# Patient Record
Sex: MALE | Race: BLACK OR AFRICAN AMERICAN | NOT HISPANIC OR LATINO | URBAN - METROPOLITAN AREA
[De-identification: names, ages, dates, MRNs, and addresses within clinical notes are randomized per-mention and may not be internally consistent; named-entity substitution may affect disease eponyms.]

---

## 2022-02-14 ENCOUNTER — EMERGENCY (EMERGENCY)
Facility: HOSPITAL | Age: 42
LOS: 1 days | Discharge: HOME | End: 2022-02-14
Attending: EMERGENCY MEDICINE | Admitting: EMERGENCY MEDICINE
Payer: COMMERCIAL

## 2022-02-14 VITALS
RESPIRATION RATE: 16 BRPM | DIASTOLIC BLOOD PRESSURE: 69 MMHG | OXYGEN SATURATION: 100 % | TEMPERATURE: 98 F | HEART RATE: 60 BPM | SYSTOLIC BLOOD PRESSURE: 127 MMHG

## 2022-02-14 PROCEDURE — 99284 EMERGENCY DEPT VISIT MOD MDM: CPT

## 2022-02-14 NOTE — ED ADULT TRIAGE NOTE - CHIEF COMPLAINT QUOTE
Pt c/o L sided headache x 1 week. States pain has been constant, taking Tylenol at home w/ minimal relief. Denies blurry vision, weakness, nausea/vomiting, dizziness. Currently taking Rizatriptan for migraines. PMHx Migraines

## 2022-02-15 RX ORDER — METOCLOPRAMIDE HCL 10 MG
10 TABLET ORAL ONCE
Refills: 0 | Status: COMPLETED | OUTPATIENT
Start: 2022-02-15 | End: 2022-02-15

## 2022-02-15 RX ORDER — CYCLOBENZAPRINE HYDROCHLORIDE 10 MG/1
5 TABLET, FILM COATED ORAL ONCE
Refills: 0 | Status: COMPLETED | OUTPATIENT
Start: 2022-02-15 | End: 2022-02-15

## 2022-02-15 RX ORDER — KETOROLAC TROMETHAMINE 30 MG/ML
15 SYRINGE (ML) INJECTION ONCE
Refills: 0 | Status: DISCONTINUED | OUTPATIENT
Start: 2022-02-15 | End: 2022-02-15

## 2022-02-15 RX ORDER — SODIUM CHLORIDE 9 MG/ML
1000 INJECTION INTRAMUSCULAR; INTRAVENOUS; SUBCUTANEOUS ONCE
Refills: 0 | Status: COMPLETED | OUTPATIENT
Start: 2022-02-15 | End: 2022-02-15

## 2022-02-15 RX ORDER — CYCLOBENZAPRINE HYDROCHLORIDE 10 MG/1
1 TABLET, FILM COATED ORAL
Qty: 6 | Refills: 0
Start: 2022-02-15 | End: 2022-02-16

## 2022-02-15 RX ADMIN — CYCLOBENZAPRINE HYDROCHLORIDE 5 MILLIGRAM(S): 10 TABLET, FILM COATED ORAL at 00:52

## 2022-02-15 RX ADMIN — Medication 15 MILLIGRAM(S): at 00:49

## 2022-02-15 RX ADMIN — SODIUM CHLORIDE 1000 MILLILITER(S): 9 INJECTION INTRAMUSCULAR; INTRAVENOUS; SUBCUTANEOUS at 00:49

## 2022-02-15 RX ADMIN — Medication 10 MILLIGRAM(S): at 00:49

## 2022-02-15 NOTE — ED PROVIDER NOTE - ATTENDING CONTRIBUTION TO CARE
42yo M pmhx htn nd migraines on prn rizatriptan, p/w 1 wk of atraumatic L temporal headache with associated L neck and jaw pain. Pain worse with rotation of neck. Pt has seen neurologist in past and had MRI for his headaches and told it was normal.  No fevers or focal weakness or paresthesias    General: Patient alert in no apparent distress  Skin: Dry and intact  HEENT: Head atraumatic. Oral mucosa moist. no ttp to temporal artery region  Eyes: Conjunctiva normal  Cardiac: Regular rhythm and rate. No pretibial edema b/l  Respiratory: Lungs clear b/l and symmetric. No respiratory distress. Able to speak in complete sentences.  Gastrointestinal: Abdomen soft, nondistended, nontender  Musculoskeletal: Moves all extremities spontaneously. +ttp to L cervical paravertebral muscles.  Neurological: alert and oriented to person, place, and time  Psychiatric: Calm and cooperative    a/p  headache and neck pain  not meningitic  tension ha/migrain/muscle strain  pain meds then re-eval  likely dc home with supportive care

## 2022-02-15 NOTE — ED PROVIDER NOTE - CLINICAL SUMMARY MEDICAL DECISION MAKING FREE TEXT BOX
Concern for muscle spasm, migraine. No red flag headache sxs. No neuro deficits. Labs, IVF. Plan to dc home with continue follow up with neurologist.

## 2022-02-15 NOTE — ED PROVIDER NOTE - PATIENT PORTAL LINK FT
You can access the FollowMyHealth Patient Portal offered by [facility] by registering at the following website: [email or web address]. By joining [facility]’s FollowMyHealth portal, you will also be able to view your health information using other applications (apps) compatible with our system.

## 2022-02-15 NOTE — ED ADULT NURSE NOTE - OBJECTIVE STATEMENT
42yo male received in intake 10b. pt A&Ox4, ambulatory, hx of HTN, c/o headache and neck pain for a week. pain worsened with movement. outpt MRI negative. respiration even and non-labored. in nad. 20G IV placed in lac, lab drawn and sent. md marquez in progress. safety maintained.

## 2022-02-15 NOTE — ED PROVIDER NOTE - NSFOLLOWUPINSTRUCTIONS_ED_ALL_ED_FT
You were seen for headache.     See attached instructions for more information.    No signs of emergency medical condition on today's workup.  Your results are attached with your discharge instructions, please review them with your primary care physician. If there is a result pending, you will receive a call if test is positive.    A presumptive diagnosis is made today, but further evaluation may be required by your primary care doctor and/or specialist for a definitive diagnosis. Therefore, follow up as directed and if symptoms change/worsen or any emergency conditions, please return to the ER.    For pain or fever you can ibuprofen (motrin, advil) or tylenol as needed, as directed on packaging.    You can use 500-1000mg Tylenol every 6 hours for pain - as needed.  This is an over-the-counter medications - please respect the warnings on the label. This medication come with certain risks and side effects that you need to discuss with your doctor, especially if you are taking it for a prolonged period.    You can use 400-600mg Ibuprofen (such as motrin or advil) every 6 to 8 hours as needed for pain control.  Take ibuprofen with food or milk to lessen stomach upset.  This is an over-the-counter medication please respect the warnings on the label. All medications come with certain risks and side effects that you need to discuss with your doctor, especially if you are taking them for a prolonged period.      If needed, call patient access services at [phone] to find a primary care doctor, or call at [phone] to make an appointment at the clinic.      Acute Headache    WHAT YOU NEED TO KNOW:    An acute headache is pain or discomfort that may start suddenly and get worse quickly. You may have an acute headache only when you feel stress or eat certain foods. Other acute headache pain can happen every day, and sometimes several times a day.     DISCHARGE INSTRUCTIONS:    Seek care immediately if:   •You have severe pain.      •You have numbness or weakness on one side of your face or body.      •You have a headache that occurs after a blow to the head, a fall, or other trauma.       •You have a headache, are forgetful or confused, or have trouble speaking.      •You have a headache, stiff neck, and a fever.      Call your doctor if:   •You have a constant headache and are vomiting.      •You have a headache each day that does not get better, even after treatment.      •You have changes in your headaches, or new symptoms that occur when you have a headache.      •You have questions or concerns about your condition or care.      Medicines: You may need any of the following:   •Prescription pain medicine may be given. The medicine your healthcare provider recommends will depend on the kind of headaches you have. You will need to take prescription headache medicines as directed to prevent a problem called rebound headache. These headaches happen with regular use of pain relievers for headache disorders.      •NSAIDs, such as ibuprofen, help decrease swelling, pain, and fever. This medicine is available with or without a doctor's order. NSAIDs can cause stomach bleeding or kidney problems in certain people. If you take blood thinner medicine, always ask your healthcare provider if NSAIDs are safe for you. Always read the medicine label and follow directions.      •Acetaminophen decreases pain and fever. It is available without a doctor's order. Ask how much to take and how often to take it. Follow directions. Read the labels of all other medicines you are using to see if they also contain acetaminophen, or ask your doctor or pharmacist. Acetaminophen can cause liver damage if not taken correctly. Do not use more than 3 grams (3,000 milligrams) total of acetaminophen in one day.       •Antidepressants may be given for some kinds of headaches.       •Take your medicine as directed. Contact your healthcare provider if you think your medicine is not helping or if you have side effects. Tell him or her if you are allergic to any medicine. Keep a list of the medicines, vitamins, and herbs you take. Include the amounts, and when and why you take them. Bring the list or the pill bottles to follow-up visits. Carry your medicine list with you in case of an emergency.      Manage your symptoms:   •Apply heat or ice on the headache area. Use a heat or ice pack. For an ice pack, you can also put crushed ice in a plastic bag. Cover the pack or bag with a towel before you apply it to your skin. Ice and heat both help decrease pain, and heat also helps decrease muscle spasms. Apply heat for 20 to 30 minutes every 2 hours. Apply ice for 15 to 20 minutes every hour. Apply heat or ice for as long and for as many days as directed. You may alternate heat and ice.      •Relax your muscles. Lie down in a comfortable position and close your eyes. Relax your muscles slowly. Start at your toes and work your way up your body.      •Keep a record of your headaches. Write down when your headaches start and stop. Include your symptoms and what you were doing when the headache began. Record what you ate or drank for 24 hours before the headache started. Describe the pain and where it hurts. Keep track of what you did to treat your headache and if it worked.     Prevent an acute headache:   •Avoid anything that triggers an acute headache. Examples include exposure to chemicals, going to high altitude, or not getting enough sleep. Create a regular sleep routine. Go to sleep at the same time and wake up at the same time each day. Do not use electronic devices before bedtime. These may trigger a headache or prevent you from sleeping well.    •Do not smoke. Nicotine and other chemicals in cigarettes and cigars can trigger an acute headache or make it worse. Ask your healthcare provider for information if you currently smoke and need help to quit. E-cigarettes or smokeless tobacco still contain nicotine. Talk to your healthcare provider before you use these products.     •Limit alcohol as directed. Alcohol can trigger an acute headache or make it worse. If you have cluster headaches, do not drink alcohol during an episode. For other types of headaches, ask your healthcare provider if it is safe for you to drink alcohol. Ask how much is safe for you to drink, and how often.    •Exercise as directed. Exercise can reduce tension and help with headache pain. Aim for 30 minutes of physical activity on most days of the week. Your healthcare provider can help you create an exercise plan.    •Eat a variety of healthy foods. Healthy foods include fruits, vegetables, low-fat dairy products, lean meats, fish, whole grains, and cooked beans. Your healthcare provider or dietitian can help you create meals plans if you need to avoid foods that trigger headaches.    Follow up with your healthcare provider as directed: Bring your headache record with you when you see your healthcare provider. Write down your questions so you remember to ask them during your visits.

## 2022-02-15 NOTE — ED PROVIDER NOTE - NSFOLLOWUPCLINICS_GEN_ALL_ED_FT
[facility]  Dental  [address] [place] [id number]  Phone: [phone]  Fax:     Oral & Maxillofacial Surgery  Department of Dental Medicine  [phone] [place], [place] [id number]  Phone: [phone]  Fax: [phone]

## 2022-02-15 NOTE — ED PROVIDER NOTE - PHYSICAL EXAMINATION
Gen: non toxic appearing, NAD   Head: NC/NT, no ttp of temporal region  Eyes:  anicteric  ENT: airway patent, mmm, no gum swelling/ttp  CV: RRR, +S1/S2   Resp: CTAB, symmetric breath sounds   GI:  abdomen soft non-distended, NTTP   Extremities -  no edema  MSK: ttp of LT cervical paraspinal region  Neuro: A&Ox4, following commands, speech clear 5/5 strength, sensation intact, cn 3-12 is grossly intact, finger to nose intact

## 2022-02-15 NOTE — ED PROVIDER NOTE - OBJECTIVE STATEMENT
40 yo M with pmhx of htn presents with 1 week of LT temporal and LT side of the neck pain. Pain gets worse when he looks to the left. No dental pain. No trismus.. States this is similar to his past episodes of headaches. Has been taking tylenol and rizatriptan without relief. Had MRI with neurologist a month ago which was negative. Unsure if he is dx with migraine headache. No vision changes, dizziness, change in strength or sensation in extremities, fever, chills, abd pain, n/v. Denies drinking alcohol/using recreational drugs, smoking.

## 2022-02-15 NOTE — ED PROVIDER NOTE - PROGRESS NOTE DETAILS
Lelo Wren, DO PGY-3: states his pain is resolved at this time. Requests a number for dentist - states his last dentist stopped taking his insurance. Will send flexeril to pharmacy.

## 2023-08-16 ENCOUNTER — APPOINTMENT (OUTPATIENT)
Dept: PULMONOLOGY | Facility: CLINIC | Age: 43
End: 2023-08-16